# Patient Record
Sex: FEMALE | Race: WHITE | Employment: FULL TIME | URBAN - METROPOLITAN AREA
[De-identification: names, ages, dates, MRNs, and addresses within clinical notes are randomized per-mention and may not be internally consistent; named-entity substitution may affect disease eponyms.]

---

## 2018-06-29 ENCOUNTER — OFFICE VISIT (OUTPATIENT)
Dept: FAMILY MEDICINE CLINIC | Age: 38
End: 2018-06-29

## 2018-06-29 VITALS
RESPIRATION RATE: 18 BRPM | TEMPERATURE: 98.3 F | SYSTOLIC BLOOD PRESSURE: 104 MMHG | DIASTOLIC BLOOD PRESSURE: 66 MMHG | WEIGHT: 198.8 LBS | OXYGEN SATURATION: 98 % | HEART RATE: 88 BPM | BODY MASS INDEX: 31.2 KG/M2 | HEIGHT: 67 IN

## 2018-06-29 DIAGNOSIS — Z23 ENCOUNTER FOR IMMUNIZATION: ICD-10-CM

## 2018-06-29 DIAGNOSIS — K22.70 BARRETT'S ESOPHAGUS WITHOUT DYSPLASIA: ICD-10-CM

## 2018-06-29 DIAGNOSIS — E66.9 OBESITY (BMI 30.0-34.9): ICD-10-CM

## 2018-06-29 DIAGNOSIS — K44.9 HIATAL HERNIA: ICD-10-CM

## 2018-06-29 DIAGNOSIS — Z00.00 PREVENTATIVE HEALTH CARE: Primary | ICD-10-CM

## 2018-06-29 RX ORDER — RANITIDINE HCL 75 MG
75 TABLET ORAL
COMMUNITY
End: 2019-12-06

## 2018-06-29 RX ORDER — CETIRIZINE HCL 10 MG
10 TABLET ORAL DAILY
COMMUNITY
End: 2019-12-06

## 2018-06-29 RX ORDER — CYANOCOBALAMIN (VITAMIN B-12) 1000 MCG
1 TABLET, EXTENDED RELEASE ORAL
COMMUNITY
End: 2019-12-06

## 2018-06-29 NOTE — MR AVS SNAPSHOT
[address] [place], [place] [address]d[facility]ie [facility] 
[phone] Patient: Kathy Arellano MRN: [id number] Visit Information Date & Time Provider Department Dept. Phone Encounter #  
 6/29/2018  2:00 PM Seema Quintero, [address] [phone] [id number] Follow-up Instructions Return in about 3 months (around 9/29/2018) for Follow up. Upcoming Health Maintenance Date Due DTaP/Tdap/Td series (1 - Tdap) 3/31/1999 Influenza Age 5 to Adult 8/1/2018 PAP AKA CERVICAL CYTOLOGY 6/1/2020 Allergies as of 6/29/2018  Review Complete On: 6/29/2018 By: Seema Quintero MD  
  
 Severity Noted Reaction Type Reactions Contrave [Naltrexone-bupropion]  06/29/2018   Side Effect Other (comments) Made her extremely emotional  
  
Current Immunizations  Reviewed on 6/26/2018 No immunizations on file. Not reviewed this visit You Were Diagnosed With   
  
 Codes Comments Preventative health care    -  Primary ICD-10-CM: Z00.00 ICD-9-CM: V70.0 Garvey's esophagus without dysplasia     ICD-10-CM: K22.70 ICD-9-CM: 530.85 Hiatal hernia     ICD-10-CM: K44.9 ICD-9-CM: 553.3 Obesity (BMI 30.0-34.9)     ICD-10-CM: H53.1 ICD-9-CM: 278.00 Encounter for immunization     ICD-10-CM: V55 ICD-9-CM: V03.89 Vitals BP Pulse Temp Resp Height(growth percentile) Weight(growth percentile) 104/66 (BP 1 Location: Left arm, BP Patient Position: Sitting) 88 98.3 °F (36.8 °C) (Oral) 18 5' 7\" (1.702 m) 198 lb 12.8 oz (90.2 kg) LMP SpO2 BMI OB Status Smoking Status 06/08/2018 98% 31.14 kg/m2 Having regular periods Never Smoker BMI and BSA Data Body Mass Index Body Surface Area  
 31.14 kg/m 2 2.06 m 2 Preferred Pharmacy Pharmacy Name Phone [id number], [place] [address] [place] [address] [address] [phone] Your Updated Medication List  
  
   
This list is accurate as of 6/29/18  3:11 PM.  Always use your most recent med list.  
  
  
  
  
 APPLE CIDER VINEGAR PLUS PO Take 750 mg by mouth daily. TAKES TWO TABS DAILY Reather Numbers 1.5/30 (21) 1.5-30 mg-mcg Tab Generic drug:  norethindrone ac-eth estradiol  
daily. OTHER  
1/2 OF A DROPPER DAILY pantoprazole 40 mg tablet Commonly known as:  PROTONIX Take 1 Tab by mouth daily. PROBIOTIC PO Take  by mouth daily. raNITIdine 75 mg tablet Commonly known as:  ZANTAC Take 75 mg by mouth nightly. TAKES TWO TABS AT BEDTIME  
  
 selenium 200 mcg Cap Take 1 Tab by mouth. zinc 50 mg Tab tablet Take  by mouth daily. ZyrTEC 10 mg tablet Generic drug:  cetirizine Take 10 mg by mouth daily. We Performed the Following CBC WITH AUTOMATED DIFF [12136 CPT(R)] LIPID PANEL [38074 CPT(R)] METABOLIC PANEL, COMPREHENSIVE [80603 CPT(R)] T4, FREE [id number] CPT(R)] TSH 3RD GENERATION [43478 CPT(R)] Follow-up Instructions Return in about 3 months (around 9/29/2018) for Follow up. Patient Instructions Limit wine to 7 glasses per week (for your liver) 3-5 glasses per week would be better for your weight Try to cut out Diet Coke Find other drinks you like (flavored water, unsweet tea or hot tea) I would encourage you to pack your lunch Berries, apples, various veggies OR, get the same sandwich but with ham 
 
Keep exercising Garvey's Esophagus: Care Instructions Your Care Instructions The esophagus is the tube that connects the throat to the stomach. Food and liquids go through this tube. In Garvey's esophagus, the cells that line the tube change. This is usually because of gastroesophageal reflux disease (GERD). GERD causes acid from your stomach to back up into the esophagus.  
When you have Garvey's esophagus, you are slightly more likely to get cancer of the esophagus. So regular testing is important to watch for signs of this cancer. You can treat GERD to control your symptoms and feel better. Follow-up care is a key part of your treatment and safety. Be sure to make and go to all appointments, and call your doctor if you are having problems. It's also a good idea to know your test results and keep a list of the medicines you take. How can you care for yourself at home? · Take your medicines exactly as prescribed. Call your doctor if you think you are having a problem with your medicine. · If you take over-the-counter medicine, such as antacids or acid reducers, follow all instructions on the label. If you use these medicines often, talk with your doctor. Be careful when you take over-the-counter antacid medicines. Many of these medicines have aspirin in them. Read the label to make sure that you are not taking more than the recommended dose. Too much aspirin can be harmful. · Do not smoke or chew tobacco. Smoking can make GERD worse. If you need help quitting, talk to your doctor about stop-smoking programs and medicines. These can increase your chances of quitting for good. · Chocolate, mint, and alcohol can make GERD worse. They relax the valve between the esophagus and the stomach. · Spicy foods, foods that have a lot of acid (like tomatoes and oranges), and coffee can make GERD symptoms worse in some people. If your symptoms are worse after you eat a certain food, you may want to stop eating that food to see if your symptoms get better. · Eat smaller meals, and more often. After eating, wait 2 to 3 hours before you lie down. · Raise the head of your bed 6 to 8 inches by putting blocks under the frame or a foam wedge under the head of the mattress. · Do not wear tight clothing around your midsection. · If you are overweight, lose weight. Even losing 5 to 10 pounds can help. When should you call for help? Call your doctor now or seek immediate medical care if: 
? · You have new or worse belly pain. ? · You are vomiting. ? Watch closely for changes in your health, and be sure to contact your doctor if: 
? · You have any pain or difficulty swallowing. ? · You are losing weight. ? · You have new or worse symptoms, such as heartburn. ? · You do not get better as expected. Where can you learn more? Go to http://anabelle-kev.info/. Enter L146 in the search box to learn more about \"Garvey's Esophagus: Care Instructions. \" Current as of: May 12, 2017 Content Version: 11.4 © 4705-6316 [employer]. Care instructions adapted under license by [employer] (which disclaims liability or warranty for this information). If you have questions about a medical condition or this instruction, always ask your healthcare professional. [place] [address] any warranty or liability for your use of this information. Introducing hospitals & HEALTH SERVICES! [facility] introduces [employer] patient portal. Now you can access parts of your medical record, email your doctor's office, and request medication refills online. 1. In your internet browser, go to [email or web address] 2. Click on the First Time User? Click Here link in the Sign In box. You will see the New Member Sign Up page. 3. Enter your [employer] Access Code exactly as it appears below. You will not need to use this code after youve completed the sign-up process. If you do not sign up before the expiration date, you must request a new code. · [employer] Access Code: [email or web address] Expires: 9/27/2018  3:11 PM 
 
4. Enter the last four digits of your Social Security Number (xxxx) and Date of Birth (mm/dd/yyyy) as indicated and click Submit. You will be taken to the next sign-up page. 5. Create a [employer] ID.  This will be your [employer] login ID and cannot be changed, so think of one that is secure and easy to remember. 6. Create a [employer] password. You can change your password at any time. 7. Enter your Password Reset Question and Answer. This can be used at a later time if you forget your password. 8. Enter your e-mail address. You will receive e-mail notification when new information is available in [address] 9. Click Sign Up. You can now view and download portions of your medical record. 10. Click the Download Summary menu link to download a portable copy of your medical information. If you have questions, please visit the Frequently Asked Questions section of the [employer] website. Remember, [employer] is NOT to be used for urgent needs. For medical emergencies, dial 911. Now available from your iPhone and Android! Please provide this summary of care documentation to your next provider. Your primary care clinician is listed as Lennox Camarena. If you have any questions after today's visit, please call [phone].

## 2018-06-29 NOTE — PROGRESS NOTES
Bhavesh Berger [address] [facility]. Lena, [address] [facility]  [phone]             Date of visit: 6/29/2018   Subjective:      History obtained from:  the patient. Jesús London is a 34 y.o. female who presents today for new patient, hasn't been here in a few years    Wasn't having many symptoms but dentist kept seeing signs of reflux on her teeth  Finally got EGD in Aug 2017, showed hiatal hernia and Barretts without dysplasia  Not feeling heartburn much or coughing  Going to repeat endoscopy in August       Frustrated with her weight  Weight about the same  Doing exercise regularly  Did a diet in Jan, only 6 pounds but lost 20 inches overall  80 day obsession  When she did that would have protein shake or smoothie in AM  Regimented plan on what/when she ate  Lunch vinay/lettuce/apple  Snacks cottage cheese or humus with pretzels'  Sensible dinner with zucchini noodles and turkey/spaghetti sauce  Was on plan C for 2000kcal/day  Has used myfitness pal, usually  1800 but not losing weight    Since program finished is still doing protein smothie on weekday, waffles on weekend  Robinson/chips for lunch  Dinner might have pasta instead of zucchini noodles or a turkey burger with bread or tacos    Stopped drinking etoh for 2.5 months but that didn't help her weight either  Quit coffee to do the tea detox right now.  With 1t honey in it    Hasn't tried quitting Diet coke yet, thinking about that  Usually has it at lunch, cafeteria at work with fountain soda, sandwich and chips  1 bottle of wine per week, has it on weekends    At home blood test marco a well food sensitivity test  Also had metabolism test showing cortisol, TSH, and testosterone     Took contrave 2014  Definitely helped but made her cry a lot    Pap normal last year per gyn  Had normal mammogram in past, will do another this year    Patient Active Problem List    Diagnosis Date Noted    Garvey's esophagus without dysplasia 06/29/2018    Hiatal hernia 06/29/2018    Obesity (BMI 30.0-34.9) 06/29/2018     Current Outpatient Prescriptions   Medication Sig Dispense Refill    selenium 200 mcg cap Take 1 Tab by mouth.  chrm/vineg/bit-orang peel/gr t (APPLE CIDER VINEGAR PLUS PO) Take 750 mg by mouth daily. TAKES TWO TABS DAILY      zinc 50 mg tab tablet Take  by mouth daily.  OTHER 1/2 OF A DROPPER DAILY      cetirizine (ZYRTEC) 10 mg tablet Take 10 mg by mouth daily.  raNITIdine (ZANTAC) 75 mg tablet Take 75 mg by mouth nightly. TAKES TWO TABS AT BEDTIME     Marjean Ion 1.5/30, 21, 1.5-30 mg-mcg tab daily.  pantoprazole (PROTONIX) 40 mg tablet Take 1 Tab by mouth daily. 30 Tab 2    LACTOBACILLUS ACIDOPHILUS (PROBIOTIC PO) Take  by mouth daily.        Allergies   Allergen Reactions    Contrave [Naltrexone-Bupropion] Other (comments)     Made her extremely emotional     Past Medical History:   Diagnosis Date    AR (allergic rhinitis)     Garvey's esophagus 08/2017    Garvey's esophagus without dysplasia 6/29/2018    Hiatal hernia 08/2017    Overactive bladder      Past Surgical History:   Procedure Laterality Date    HX ENDOSCOPY  08/2017     Family History   Problem Relation Age of Onset    GERD Father     Breast Cancer Maternal Grandmother     Depression Sister     Breast Cancer Maternal Aunt     Colon Cancer Neg Hx     Colon Polyps Neg Hx      Social History   Substance Use Topics    Smoking status: Never Smoker    Smokeless tobacco: Never Used    Alcohol use Yes      Comment: ~ 4 drinks a week (glass of wine)      Social History     Social History Narrative    Works in [address] [place] [address] Avenue: denies chest pain  Pulm: denies shortness of breath   GI: denies hematochezia  Psych denies depressed mood or anhedonia        Objective:     Vitals:    06/29/18 1420   BP: 104/66   Pulse: 88   Resp: 18   Temp: 98.3 °F (36.8 °C)   TempSrc: Oral   SpO2: 98%   Weight: 198 lb 12.8 oz (90.2 kg)   Height: 5' 7\" (1.702 m)     Body mass index is 31.14 kg/(m^2). General: stated age,obese, and in NAD  Eyes: PERRL, EOMI, no redness or drainage  Nose: no drainage  Mouth: no lesions  Throat: no erythema, exudate or swelling  Neck: supple, symmetrical, trachea midline, no adenopathy and thyroid: not enlarged, symmetric, no tenderness/mass/nodules  Lungs:  clear to auscultation w/o rales, rhonchi, wheezes w/normal effort and no use of accessory muscles of respiration   Heart: regular rate and rhythm, S1, S2 normal, no murmur, click, rub or gallop  Abdomen: soft, nontender, no masses  Ext:  No edema noted. Lymph: no cervical adenopathy appreciated  Skin:  Normal. and no rash or abnormalities   Neuro: normal gait, CN 2-12 intact  Psych: alert and oriented to person, place, time and situation and Speech: appropriate quality, quantity and organization of sentences and normal affect    Assessment/Plan:       ICD-10-CM ICD-9-CM    1. Preventative health care Z00.00 V70.0 LIPID PANEL      CBC WITH AUTOMATED DIFF      METABOLIC PANEL, COMPREHENSIVE      TSH 3RD GENERATION      T4, FREE   2. Garvey's esophagus without dysplasia K22.70 530.85 CBC WITH AUTOMATED DIFF      METABOLIC PANEL, COMPREHENSIVE   3. Hiatal hernia K44.9 553.3    4. Obesity (BMI 30.0-34. 9) E66.9 278.00 LIPID PANEL      TSH 3RD GENERATION      T4, FREE   5.  Encounter for immunization Z23 V03.89 TETANUS, DIPHTHERIA TOXOIDS AND ACELLULAR PERTUSSIS VACCINE (TDAP), IN INDIVIDS. >=7, IM        Orders Placed This Encounter    Tetanus, diphtheria toxoids and acellular pertussis (TDAP) vaccine, in individuals >=7 years, IM    LIPID PANEL    CBC WITH AUTOMATED DIFF    METABOLIC PANEL, COMPREHENSIVE    TSH 3RD GENERATION    T4, FREE    selenium 200 mcg cap    chrm/vineg/bit-orang peel/gr t (APPLE CIDER VINEGAR PLUS PO)    zinc 50 mg tab tablet    OTHER    cetirizine (ZYRTEC) 10 mg tablet    raNITIdine (ZANTAC) 75 mg tablet       Preventive care mostly up to date  tdap today  Discussed diet, exercise, weight loss  Tips given (pt instructions)  Did not recommend another diet pill at this point; needs long-term lifestyle changes mainly with diet  The Barretts is asymptomatic, getting repeat EGD this fall but hope that will be improved with the PPI    Discussed the diagnosis and plan and she expressed understanding. Follow-up Disposition:  Return in about 3 months (around 9/29/2018) for Follow up.     Jaswant Dobbins MD

## 2018-06-29 NOTE — PATIENT INSTRUCTIONS
Limit wine to 7 glasses per week (for your liver)  3-5 glasses per week would be better for your weight  Try to cut out Diet Coke  Find other drinks you like (flavored water, unsweet tea or hot tea)    I would encourage you to pack your lunch  Berries, apples, various veggies    OR, get the same sandwich but with ham    Keep exercising         Garvey's Esophagus: Care Instructions  Your Care Instructions    The esophagus is the tube that connects the throat to the stomach. Food and liquids go through this tube. In Garvey's esophagus, the cells that line the tube change. This is usually because of gastroesophageal reflux disease (GERD). GERD causes acid from your stomach to back up into the esophagus. When you have Garvey's esophagus, you are slightly more likely to get cancer of the esophagus. So regular testing is important to watch for signs of this cancer. You can treat GERD to control your symptoms and feel better. Follow-up care is a key part of your treatment and safety. Be sure to make and go to all appointments, and call your doctor if you are having problems. It's also a good idea to know your test results and keep a list of the medicines you take. How can you care for yourself at home? · Take your medicines exactly as prescribed. Call your doctor if you think you are having a problem with your medicine. · If you take over-the-counter medicine, such as antacids or acid reducers, follow all instructions on the label. If you use these medicines often, talk with your doctor. Be careful when you take over-the-counter antacid medicines. Many of these medicines have aspirin in them. Read the label to make sure that you are not taking more than the recommended dose. Too much aspirin can be harmful. · Do not smoke or chew tobacco. Smoking can make GERD worse. If you need help quitting, talk to your doctor about stop-smoking programs and medicines.  These can increase your chances of quitting for good.  · Chocolate, mint, and alcohol can make GERD worse. They relax the valve between the esophagus and the stomach. · Spicy foods, foods that have a lot of acid (like tomatoes and oranges), and coffee can make GERD symptoms worse in some people. If your symptoms are worse after you eat a certain food, you may want to stop eating that food to see if your symptoms get better. · Eat smaller meals, and more often. After eating, wait 2 to 3 hours before you lie down. · Raise the head of your bed 6 to 8 inches by putting blocks under the frame or a foam wedge under the head of the mattress. · Do not wear tight clothing around your midsection. · If you are overweight, lose weight. Even losing 5 to 10 pounds can help. When should you call for help? Call your doctor now or seek immediate medical care if:  ? · You have new or worse belly pain. ? · You are vomiting. ? Watch closely for changes in your health, and be sure to contact your doctor if:  ? · You have any pain or difficulty swallowing. ? · You are losing weight. ? · You have new or worse symptoms, such as heartburn. ? · You do not get better as expected. Where can you learn more? Go to http://anabelle-kev.info/. Enter L146 in the search box to learn more about \"Garvey's Esophagus: Care Instructions. \"  Current as of: May 12, 2017  Content Version: 11.4  © 4935-3900 [employer]. Care instructions adapted under license by [employer] (which disclaims liability or warranty for this information). If you have questions about a medical condition or this instruction, always ask your healthcare professional. [place] [address] any warranty or liability for your use of this information.

## 2018-06-29 NOTE — PROGRESS NOTES
Chief Complaint   Patient presents with   [facility].     former patient of [facility]-here to re-establish care      1. Have you been to the ER, urgent care clinic since your last visit? Hospitalized since your last visit? No    2. Have you seen or consulted any other health care providers outside of the [address] [place] [address] since your last visit? Include any pap smears or colon screening.  No

## 2018-06-30 LAB
ALBUMIN SERPL-MCNC: 4.2 G/DL (ref 3.5–5.5)
ALBUMIN/GLOB SERPL: 1.6 {RATIO} (ref 1.2–2.2)
ALP SERPL-CCNC: 71 IU/L (ref 39–117)
ALT SERPL-CCNC: 18 IU/L (ref 0–32)
AST SERPL-CCNC: 16 IU/L (ref 0–40)
BASOPHILS # BLD AUTO: 0.1 X10E3/UL (ref 0–0.2)
BASOPHILS NFR BLD AUTO: 1 %
BILIRUB SERPL-MCNC: <0.2 MG/DL (ref 0–1.2)
BUN SERPL-MCNC: 11 MG/DL (ref 6–24)
BUN/CREAT SERPL: 15 (ref 9–23)
CALCIUM SERPL-MCNC: 9.2 MG/DL (ref 8.7–10.2)
CHLORIDE SERPL-SCNC: 106 MMOL/L (ref 96–106)
CHOLEST SERPL-MCNC: 164 MG/DL (ref 100–199)
CO2 SERPL-SCNC: 23 MMOL/L (ref 20–29)
CREAT SERPL-MCNC: 0.72 MG/DL (ref 0.57–1)
EOSINOPHIL # BLD AUTO: 0.2 X10E3/UL (ref 0–0.4)
EOSINOPHIL NFR BLD AUTO: 3 %
ERYTHROCYTE [DISTWIDTH] IN BLOOD BY AUTOMATED COUNT: 13.6 % (ref 12.3–15.4)
GLOBULIN SER CALC-MCNC: 2.6 G/DL (ref 1.5–4.5)
GLUCOSE SERPL-MCNC: 82 MG/DL (ref 65–99)
HCT VFR BLD AUTO: 38.5 % (ref 34–46.6)
HDLC SERPL-MCNC: 41 MG/DL
HGB BLD-MCNC: 12.7 G/DL (ref 11.1–15.9)
IMM GRANULOCYTES # BLD: 0 X10E3/UL (ref 0–0.1)
IMM GRANULOCYTES NFR BLD: 0 %
INTERPRETATION, 910389: NORMAL
LDLC SERPL CALC-MCNC: 73 MG/DL (ref 0–99)
LYMPHOCYTES # BLD AUTO: 3.3 X10E3/UL (ref 0.7–3.1)
LYMPHOCYTES NFR BLD AUTO: 41 %
MCH RBC QN AUTO: 28.1 PG (ref 26.6–33)
MCHC RBC AUTO-ENTMCNC: 33 G/DL (ref 31.5–35.7)
MCV RBC AUTO: 85 FL (ref 79–97)
MONOCYTES # BLD AUTO: 0.5 X10E3/UL (ref 0.1–0.9)
MONOCYTES NFR BLD AUTO: 6 %
NEUTROPHILS # BLD AUTO: 3.9 X10E3/UL (ref 1.4–7)
NEUTROPHILS NFR BLD AUTO: 49 %
PLATELET # BLD AUTO: 404 X10E3/UL (ref 150–379)
POTASSIUM SERPL-SCNC: 4.2 MMOL/L (ref 3.5–5.2)
PROT SERPL-MCNC: 6.8 G/DL (ref 6–8.5)
RBC # BLD AUTO: 4.52 X10E6/UL (ref 3.77–5.28)
SODIUM SERPL-SCNC: 142 MMOL/L (ref 134–144)
T4 FREE SERPL-MCNC: 1.15 NG/DL (ref 0.82–1.77)
TRIGL SERPL-MCNC: 251 MG/DL (ref 0–149)
TSH SERPL DL<=0.005 MIU/L-ACNC: 1.48 UIU/ML (ref 0.45–4.5)
VLDLC SERPL CALC-MCNC: 50 MG/DL (ref 5–40)
WBC # BLD AUTO: 8 X10E3/UL (ref 3.4–10.8)

## 2018-06-30 NOTE — PROGRESS NOTES
Sent in letter: All tests were perfect, including 2 thyroid tests, cholesterol, kidney, liver, sugar, electrolytes, and blood counts.

## 2019-12-06 ENCOUNTER — OFFICE VISIT (OUTPATIENT)
Dept: FAMILY MEDICINE CLINIC | Age: 39
End: 2019-12-06

## 2019-12-06 VITALS
HEART RATE: 77 BPM | RESPIRATION RATE: 19 BRPM | WEIGHT: 201.4 LBS | TEMPERATURE: 98.1 F | OXYGEN SATURATION: 98 % | SYSTOLIC BLOOD PRESSURE: 110 MMHG | HEIGHT: 67 IN | DIASTOLIC BLOOD PRESSURE: 70 MMHG | BODY MASS INDEX: 31.61 KG/M2

## 2019-12-06 DIAGNOSIS — Z11.59 ENCOUNTER FOR HEPATITIS C SCREENING TEST FOR LOW RISK PATIENT: ICD-10-CM

## 2019-12-06 DIAGNOSIS — K22.70 BARRETT'S ESOPHAGUS WITHOUT DYSPLASIA: ICD-10-CM

## 2019-12-06 DIAGNOSIS — L70.0 ACNE VULGARIS: ICD-10-CM

## 2019-12-06 DIAGNOSIS — G89.29 CHRONIC PAIN OF BOTH KNEES: ICD-10-CM

## 2019-12-06 DIAGNOSIS — K44.9 HIATAL HERNIA: ICD-10-CM

## 2019-12-06 DIAGNOSIS — E88.81 INSULIN RESISTANCE: ICD-10-CM

## 2019-12-06 DIAGNOSIS — E55.9 VITAMIN D INSUFFICIENCY: ICD-10-CM

## 2019-12-06 DIAGNOSIS — M25.561 CHRONIC PAIN OF BOTH KNEES: ICD-10-CM

## 2019-12-06 DIAGNOSIS — H93.8X3 EAR FULLNESS, BILATERAL: ICD-10-CM

## 2019-12-06 DIAGNOSIS — Z00.00 PREVENTATIVE HEALTH CARE: Primary | ICD-10-CM

## 2019-12-06 DIAGNOSIS — M25.562 CHRONIC PAIN OF BOTH KNEES: ICD-10-CM

## 2019-12-06 DIAGNOSIS — Z11.4 ENCOUNTER FOR SCREENING FOR HIV: ICD-10-CM

## 2019-12-06 DIAGNOSIS — E66.9 OBESITY (BMI 30.0-34.9): ICD-10-CM

## 2019-12-06 DIAGNOSIS — M54.50 NONSPECIFIC LOW BACK PAIN: ICD-10-CM

## 2019-12-06 RX ORDER — CLINDAMYCIN AND BENZOYL PEROXIDE 10; 50 MG/G; MG/G
GEL TOPICAL 2 TIMES DAILY
Qty: 1 TUBE | Refills: 2 | Status: SHIPPED | OUTPATIENT
Start: 2019-12-06 | End: 2020-09-11

## 2019-12-06 RX ORDER — CYANOCOBALAMIN (VITAMIN B-12) 500 MCG
TABLET ORAL
COMMUNITY
End: 2019-12-06

## 2019-12-06 NOTE — PROGRESS NOTES
Chief Complaint   Patient presents with    Complete Physical    Wax in Ear     1. Have you been to the ER, urgent care clinic since your last visit? Hospitalized since your last visit? No    2. Have you seen or consulted any other health care providers outside of the [address] [place] [address] since your last visit? Include any pap smears or colon screening.  Saw Dr. Ronnell Crabtree and Dr. Bryon Daniels

## 2019-12-06 NOTE — PATIENT INSTRUCTIONS
Well Visit, Ages 25 to 48: Care Instructions Your Care Instructions Physical exams can help you stay healthy. Your doctor has checked your overall health and may have suggested ways to take good care of yourself. He or she also may have recommended tests. At home, you can help prevent illness with healthy eating, regular exercise, and other steps. Follow-up care is a key part of your treatment and safety. Be sure to make and go to all appointments, and call your doctor if you are having problems. It's also a good idea to know your test results and keep a list of the medicines you take. How can you care for yourself at home? · Reach and stay at a healthy weight. This will lower your risk for many problems, such as obesity, diabetes, heart disease, and high blood pressure. · Get at least 30 minutes of physical activity on most days of the week. Walking is a good choice. You also may want to do other activities, such as running, swimming, cycling, or playing tennis or team sports. Discuss any changes in your exercise program with your doctor. · Do not smoke or allow others to smoke around you. If you need help quitting, talk to your doctor about stop-smoking programs and medicines. These can increase your chances of quitting for good. · Talk to your doctor about whether you have any risk factors for sexually transmitted infections (STIs). Having one sex partner (who does not have STIs and does not have sex with anyone else) is a good way to avoid these infections. · Use birth control if you do not want to have children at this time. Talk with your doctor about the choices available and what might be best for you. · Protect your skin from too much sun. When you're outdoors from 10 a.m. to 4 p.m., stay in the shade or cover up with clothing and a hat with a wide brim. Wear sunglasses that block UV rays. Even when it's cloudy, put broad-spectrum sunscreen (SPF 30 or higher) on any exposed skin. · See a dentist one or two times a year for checkups and to have your teeth cleaned. · Wear a seat belt in the car. Follow your doctor's advice about when to have certain tests. These tests can spot problems early. For everyone · Cholesterol. Have the fat (cholesterol) in your blood tested after age 21. Your doctor will tell you how often to have this done based on your age, family history, or other things that can increase your risk for heart disease. · Blood pressure. Have your blood pressure checked during a routine doctor visit. Your doctor will tell you how often to check your blood pressure based on your age, your blood pressure results, and other factors. · Vision. Talk with your doctor about how often to have a glaucoma test. 
· Diabetes. Ask your doctor whether you should have tests for diabetes. · Colon cancer. Your risk for colorectal cancer gets higher as you get older. Some experts say that adults should start regular screening at age 48 and stop at age 76. Others say to start before age 48 or continue after age 76. Talk with your doctor about your risk and when to start and stop screening. For women · Breast exam and mammogram. Talk to your doctor about when you should have a clinical breast exam and a mammogram. Medical experts differ on whether and how often women under 50 should have these tests. Your doctor can help you decide what is right for you. · Cervical cancer screening test and pelvic exam. Begin with a Pap test at age 24. The test often is part of a pelvic exam. Starting at age 27, you may choose to have a Pap test, an HPV test, or both tests at the same time (called co-testing). Talk with your doctor about how often to have testing. · Tests for sexually transmitted infections (STIs). Ask whether you should have tests for STIs. You may be at risk if you have sex with more than one person, especially if your partners do not wear condoms. For men · Tests for sexually transmitted infections (STIs). Ask whether you should have tests for STIs. You may be at risk if you have sex with more than one person, especially if you do not wear a condom. · Testicular cancer exam. Ask your doctor whether you should check your testicles regularly. · Prostate exam. Talk to your doctor about whether you should have a blood test (called a PSA test) for prostate cancer. Experts differ on whether and when men should have this test. Some experts suggest it if you are older than 39 and are -American or have a father or brother who got prostate cancer when he was younger than 72. When should you call for help? Watch closely for changes in your health, and be sure to contact your doctor if you have any problems or symptoms that concern you. Where can you learn more? Go to http://anabelle-kev.info/. Enter P072 in the search box to learn more about \"Well Visit, Ages 25 to 48: Care Instructions. \" Current as of: December 13, 2018 Content Version: 12.2 © 3481-6156 [employer]wise, Incorporated. Care instructions adapted under license by [employer] (which disclaims liability or warranty for this information). If you have questions about a medical condition or this instruction, always ask your healthcare professional. [place] [address] any warranty or liability for your use of this information. Low Back Pain: Exercises Introduction Here are some examples of exercises for you to try. The exercises may be suggested for a condition or for rehabilitation. Start each exercise slowly. Ease off the exercises if you start to have pain. You will be told when to start these exercises and which ones will work best for you. How to do the exercises Press-up 1. Lie on your stomach, supporting your body with your forearms. 2. Press your elbows down into the floor to raise your upper back.  As you do this, relax your stomach muscles and allow your back to arch without using your back muscles. As your press up, do not let your hips or pelvis come off the floor. 3. Hold for 15 to 30 seconds, then relax. 4. Repeat 2 to 4 times. Alternate arm and leg (bird dog) exercise 1. Start on the floor, on your hands and knees. 2. Tighten your belly muscles. 3. Raise one leg off the floor, and hold it straight out behind you. Be careful not to let your hip drop down, because that will twist your trunk. 4. Hold for about 6 seconds, then lower your leg and switch to the other leg. 5. Repeat 8 to 12 times on each leg. 6. Over time, work up to holding for 10 to 30 seconds each time. 7. If you feel stable and secure with your leg raised, try raising the opposite arm straight out in front of you at the same time. Knee-to-chest exercise 1. Lie on your back with your knees bent and your feet flat on the floor. 2. Bring one knee to your chest, keeping the other foot flat on the floor (or keeping the other leg straight, whichever feels better on your lower back). 3. Keep your lower back pressed to the floor. Hold for at least 15 to 30 seconds. 4. Relax, and lower the knee to the starting position. 5. Repeat with the other leg. Repeat 2 to 4 times with each leg. 6. To get more stretch, put your other leg flat on the floor while pulling your knee to your chest. 
 
Curl-ups 1. Lie on the floor on your back with your knees bent at a 90-degree angle. Your feet should be flat on the floor, about 12 inches from your buttocks. 2. Cross your arms over your chest. If this bothers your neck, try putting your hands behind your neck (not your head), with your elbows spread apart. 3. Slowly tighten your belly muscles and raise your shoulder blades off the floor.  
4. Keep your head in line with your body, and do not press your chin to your chest. 
 5. Hold this position for 1 or 2 seconds, then slowly lower yourself back down to the floor. 6. Repeat 8 to 12 times. Pelvic tilt exercise 1. Lie on your back with your knees bent. 2. \"Brace\" your stomach. This means to tighten your muscles by pulling in and imagining your belly button moving toward your spine. You should feel like your back is pressing to the floor and your hips and pelvis are rocking back. 3. Hold for about 6 seconds while you breathe smoothly. 4. Repeat 8 to 12 times. Heel dig bridging 1. Lie on your back with both knees bent and your ankles bent so that only your heels are digging into the floor. Your knees should be bent about 90 degrees. 2. Then push your heels into the floor, squeeze your buttocks, and lift your hips off the floor until your shoulders, hips, and knees are all in a straight line. 3. Hold for about 6 seconds as you continue to breathe normally, and then slowly lower your hips back down to the floor and rest for up to 10 seconds. 4. Do 8 to 12 repetitions. Hamstring stretch in doorway 1. Lie on your back in a doorway, with one leg through the open door. 2. Slide your leg up the wall to straighten your knee. You should feel a gentle stretch down the back of your leg. 3. Hold the stretch for at least 15 to 30 seconds. Do not arch your back, point your toes, or bend either knee. Keep one heel touching the floor and the other heel touching the wall. 4. Repeat with your other leg. 5. Do 2 to 4 times for each leg. Hip flexor stretch 1. Kneel on the floor with one knee bent and one leg behind you. Place your forward knee over your foot. Keep your other knee touching the floor. 2. Slowly push your hips forward until you feel a stretch in the upper thigh of your rear leg. 3. Hold the stretch for at least 15 to 30 seconds. Repeat with your other leg. 4. Do 2 to 4 times on each side. Wall sit 1. Stand with your back 10 to 12 inches away from a wall. 2. Lean into the wall until your back is flat against it. 3. Slowly slide down until your knees are slightly bent, pressing your lower back into the wall. 4. Hold for about 6 seconds, then slide back up the wall. 5. Repeat 8 to 12 times. Follow-up care is a key part of your treatment and safety. Be sure to make and go to all appointments, and call your doctor if you are having problems. It's also a good idea to know your test results and keep a list of the medicines you take. Where can you learn more? Go to http://anabelle-kev.info/. Enter N893 in the search box to learn more about \"Low Back Pain: Exercises. \" Current as of: June 26, 2019 Content Version: 12.2 © 2519-1558 [employer]orporated. Care instructions adapted under license by [employer] (which disclaims liability or warranty for this information). If you have questions about a medical condition or this instruction, always ask your healthcare professional. [address] any warranty or liability for your use of this information.

## 2019-12-06 NOTE — PROGRESS NOTES
[facility]  [place]. [place], [address]  [phone]             Date of visit: 12/6/2019   Subjective:      History obtained from:  the patient. Todd Lara is a 37 y.o. female who presents today for physical    Felt like ears had wax  Not painful  Years ago had large amount removed    Right knee hurting today  Pain tends to be behind knee cap  Worse with activity  Both hurt at times. Has been having lower back pain  Saw Dr. Sophia Argueta  Had MRI, didn't show anything  He suggested PT but she has not scheduled yet    History of Barretts esophagus without dysplasia noted on EGD in 8/17  Repeated in 9/18, no changes  Will repeat EGD in 3 years  Just went back to GI who said ok to try off zantac  Will see her next year    Difficultly losing weight in the past and has tried many things  This past year saw her obgyn to discuss  Then saw Dr. Stacey Taylor weight loss doctor and his dietician. Quit seeing them this year  He was rx'ing metformin 2000mg daily (insulin levels were in the 50s and a1c was normal) and naltrexone (didn't help that much)  That did help, lost some weight, also was on a more regimented food program  Then he mentioned giving her topamax but she didn't want to use that, was concerned about risk  He also mentioned saxenda but insurance would not cover. Then did another program called stronger you that checked macros (limited carbs, fat, protein)  Got down to 189    Also in the past  Has tried many regimens (80 day obsession, plan C, myFitness pal at 1800kcal, protein smoothie regimen, coming off etoh, regular exercise, etc)  Took contrave 2014 (helped but made her cry a lot)  Has tried Weight Watchers earlier this summer, didn't work for her.   Has tried keto  Has done 21 day sugar detox  Tried Whole 30 but only for 2 weeks    Not a binge eater  Has protein shake with spinach, 1/2 banana, 1 tsp pb, almond milk  4 biscoff cookies for snack  Lunch a salad or PB&J with low carb bread, doritos  Sometimes pretzels  Trying not to snack after work  Dinner varies, might have 1 slice pizza, or spaghetti, or chicken, or fish, or different things she makes  Doesn't eat after dinner  Trying not to drink as much  Mostly drinks water, 1 diet Pepsi at lunch    Does a lot of exercise, walks an hour, also beach body work out    Periods mostly regular with pills, some months doesn't bleed  Pap up to date with gyn this summer, normal  Had normal mammogram in the past, did get a mammogram last year, was fine, considering every year. Wonders if perimenpausal because hebert. The [employer] is helping her mood    Having more acne on face  Would like an acne cream  Not sure what has helped in the past  Has not had this for a long time    Already had flu shot    Patient Active Problem List    Diagnosis Date Noted    Acne vulgaris 12/06/2019    Chronic pain of both knees 12/06/2019    Nonspecific low back pain 12/06/2019    Garvey's esophagus without dysplasia 06/29/2018    Hiatal hernia 06/29/2018    Obesity (BMI 30.0-34.9) 06/29/2018     Current Outpatient Medications   Medication Sig Dispense Refill    clindamycin-benzoyl peroxide (BENZACLIN) 1-5 % topical gel Apply  to affected area two (2) times a day. Apply to affected area after the skin has been cleansed and dried. 1 Tube 2    JUNEL 1.5/30, 21, 1.5-30 mg-mcg tab daily.  pantoprazole (PROTONIX) 40 mg tablet Take 1 Tab by mouth daily. 30 Tab 2    vitamin b12-folic acid 0.4-8 mg tab Vitamin B12      cholecalciferol (VITAMIN D3) (400 Units /10 mcg) tab tablet Vitamin D3      selenium 200 mcg cap Take 1 Tab by mouth.  chrm/vineg/bit-orang peel/gr t (APPLE CIDER VINEGAR PLUS PO) Take 750 mg by mouth daily. TAKES TWO TABS DAILY      zinc 50 mg tab tablet Take  by mouth daily.  OTHER 1/2 OF A DROPPER DAILY      cetirizine (ZYRTEC) 10 mg tablet Take 10 mg by mouth daily.       raNITIdine (ZANTAC) 75 mg tablet Take 75 mg by mouth nightly. TAKES TWO TABS AT BEDTIME      LACTOBACILLUS ACIDOPHILUS (PROBIOTIC PO) Take  by mouth daily. Allergies   Allergen Reactions    Contrave [Naltrexone-Bupropion] Other (comments)     Made her extremely emotional     Past Medical History:   Diagnosis Date    AR (allergic rhinitis)     Garvey's esophagus 08/2017    Garvey's esophagus without dysplasia 6/29/2018    Hiatal hernia 08/2017    Overactive bladder      Past Surgical History:   Procedure Laterality Date    HX ENDOSCOPY  08/2017    HX ENDOSCOPY  09/20/2018    Garvey's esophagus no dysplasia repeat 3 years     Family History   Problem Relation Age of Onset    GERD Father     Breast Cancer Maternal Grandmother     Depression Sister     Breast Cancer Maternal Aunt     Colon Cancer Neg Hx     Colon Polyps Neg Hx      Social History     Tobacco Use    Smoking status: Never Smoker    Smokeless tobacco: Never Used   Substance Use Topics    Alcohol use: Yes     Comment: ~ 4 drinks a week (glass of wine)      Social History     Patient does not qualify to have social determinant information on file (likely too young). Social History Narrative    Works in [employer], dad, sister in the area        Review of Systems  Psych: denies suicidal ideation  GI denies abd pain    3 most recent PHQ Screens 12/6/2019   PHQ Not Done -   Little interest or pleasure in doing things Not at all   Feeling down, depressed, irritable, or hopeless Not at all   Total Score PHQ 2 0        Objective:     Vitals:    12/06/19 0828   BP: 110/70   Pulse: 77   Resp: 19   Temp: 98.1 °F (36.7 °C)   TempSrc: Oral   SpO2: 98%   Weight: 201 lb 6.4 oz (91.4 kg)   Height: 5' 7\" (1.702 m)     Body mass index is 31.54 kg/m².      General: stated age,obese, and in NAD  Ears: TMs clear  Eyes: PERRL, EOMI, no redness or drainage  Nose: no drainage  Mouth: no lesions  Throat: no erythema, exudate or swelling  Neck: supple, symmetrical, trachea midline, no adenopathy and thyroid: not enlarged, symmetric, no tenderness/mass/nodules  Lungs:  clear to auscultation w/o rales, rhonchi, wheezes w/normal effort and no use of accessory muscles of respiration   Heart: regular rate and rhythm, S1, S2 normal, no murmur, click, rub or gallop  Abdomen: soft, nontender, no masses  MSK: knees without effusion or tenderness, normal ROM  Ext:  No edema noted. Lymph: no cervical adenopathy appreciated  Skin:  Normal. and no rash or abnormalities   Neuro: normal gait, CN 2-12 intact  Psych: alert and oriented to person, place, time and situation and Speech: appropriate quality, quantity and organization of sentences and normal affect  Assessment/Plan:       ICD-10-CM ICD-9-CM    1. Preventative health care Z00.00 V70.0 INSULIN      CBC WITH AUTOMATED DIFF      METABOLIC PANEL, COMPREHENSIVE      LIPID PANEL      HEMOGLOBIN A1C WITH EAG      VITAMIN D, 25 HYDROXY      HEPATITIS C AB      HIV 1/2 AG/AB, 4TH GENERATION,W RFLX CONFIRM   2. Garvey's esophagus without dysplasia K22.70 530.85 CBC WITH AUTOMATED DIFF      METABOLIC PANEL, COMPREHENSIVE   3. Ear fullness, bilateral H93.8X3 388.8    4. Acne vulgaris L70.0 706.1    5. Chronic pain of both knees M25.561 719.46     M25.562 338.29     G89.29     6. Obesity (BMI 30.0-34. 9) E66.9 278.00    7. Nonspecific low back pain M54.5 724.2    8. Hiatal hernia K44.9 553.3    9. Insulin resistance E88.81 277.7 INSULIN      HEMOGLOBIN A1C WITH EAG   10. Vitamin D insufficiency E55.9 268.9 VITAMIN D, 25 HYDROXY   11.  Encounter for screening for HIV Z11.4 V73.89 HIV 1/2 AG/AB, 4TH GENERATION,W RFLX CONFIRM   12. Encounter for hepatitis C screening test for low risk patient Z11.59 V73.89 HEPATITIS C AB        Orders Placed This Encounter    INSULIN    CBC WITH AUTOMATED DIFF    METABOLIC PANEL, COMPREHENSIVE    LIPID PANEL    HEMOGLOBIN A1C WITH EAG    VITAMIN D, 25 HYDROXY    HEPATITIS C AB    HIV 1/2 AG/AB, 4TH GENERATION,W RFLX CONFIRM  vitamin b12-folic acid 1.3-2 mg tab    cholecalciferol (VITAMIN D3) (400 Units /10 mcg) tab tablet    clindamycin-benzoyl peroxide (BENZACLIN) 1-5 % topical gel     Preventive up to date  Very good exercise regimen  Needs to eat low carb more consistently  Unfortunately just has a low metabolism and tends to do better with her weight when she eats low carb  Has tried many diets, weight loss meds as above  If we tried another med would do topamax  I don't think her weight is bad enough to justify the risks of Saxenda, and her insurance would not cover it anyway  Recheck insulin levels as she was concerned about those, had insulin resistance but still with normal a1c last year  Didn't really like metformin so won't resume that unless really needed  Try benzaclin for acne  May be perimenopausal as she asked, hard to tell with on OCPs but she doesn't want to stop those  Will continue to see her gyn  Discussed risks/benefits mammogram and questionable benefit in her 45s, she elects to do every 2 years due to family history in 2nd degree relatives  Pain in knees sounds like nonspecific anterior knee pain, recommended quad strengthening  Gave home exercises for nonspecific low back pain (already had neg MRI) as she is not really wanting to do PT  Barrets stable, followed by GI, they cut out her zantac so only on protonix now  Has known hiatal hernia  Vit D low in the past    Discussed the diagnosis and plan and she expressed understanding.     Follow-up and Dispositions    · Return in about 1 year (around 12/6/2020) for Full Beryl Berumen MD

## 2019-12-07 LAB
25(OH)D3+25(OH)D2 SERPL-MCNC: 44.8 NG/ML (ref 30–100)
ALBUMIN SERPL-MCNC: 4.3 G/DL (ref 3.5–5.5)
ALBUMIN/GLOB SERPL: 1.7 {RATIO} (ref 1.2–2.2)
ALP SERPL-CCNC: 71 IU/L (ref 39–117)
ALT SERPL-CCNC: 15 IU/L (ref 0–32)
AST SERPL-CCNC: 12 IU/L (ref 0–40)
BASOPHILS # BLD AUTO: 0.1 X10E3/UL (ref 0–0.2)
BASOPHILS NFR BLD AUTO: 2 %
BILIRUB SERPL-MCNC: 0.4 MG/DL (ref 0–1.2)
BUN SERPL-MCNC: 11 MG/DL (ref 6–24)
BUN/CREAT SERPL: 14 (ref 9–23)
CALCIUM SERPL-MCNC: 9.4 MG/DL (ref 8.7–10.2)
CHLORIDE SERPL-SCNC: 106 MMOL/L (ref 96–106)
CHOLEST SERPL-MCNC: 180 MG/DL (ref 100–199)
CO2 SERPL-SCNC: 23 MMOL/L (ref 20–29)
CREAT SERPL-MCNC: 0.79 MG/DL (ref 0.57–1)
EOSINOPHIL # BLD AUTO: 0.2 X10E3/UL (ref 0–0.4)
EOSINOPHIL NFR BLD AUTO: 2 %
ERYTHROCYTE [DISTWIDTH] IN BLOOD BY AUTOMATED COUNT: 12.6 % (ref 12.3–15.4)
EST. AVERAGE GLUCOSE BLD GHB EST-MCNC: 108 MG/DL
GLOBULIN SER CALC-MCNC: 2.6 G/DL (ref 1.5–4.5)
GLUCOSE SERPL-MCNC: 87 MG/DL (ref 65–99)
HBA1C MFR BLD: 5.4 % (ref 4.8–5.6)
HCT VFR BLD AUTO: 40.5 % (ref 34–46.6)
HCV AB S/CO SERPL IA: <0.1 S/CO RATIO (ref 0–0.9)
HDLC SERPL-MCNC: 43 MG/DL
HGB BLD-MCNC: 13.7 G/DL (ref 11.1–15.9)
HIV 1+2 AB+HIV1 P24 AG SERPL QL IA: NON REACTIVE
IMM GRANULOCYTES # BLD AUTO: 0 X10E3/UL (ref 0–0.1)
IMM GRANULOCYTES NFR BLD AUTO: 0 %
INSULIN SERPL-ACNC: 9.1 UIU/ML (ref 2.6–24.9)
INTERPRETATION, 910389: NORMAL
LDLC SERPL CALC-MCNC: 96 MG/DL (ref 0–99)
LYMPHOCYTES # BLD AUTO: 2.2 X10E3/UL (ref 0.7–3.1)
LYMPHOCYTES NFR BLD AUTO: 26 %
MCH RBC QN AUTO: 28.2 PG (ref 26.6–33)
MCHC RBC AUTO-ENTMCNC: 33.8 G/DL (ref 31.5–35.7)
MCV RBC AUTO: 84 FL (ref 79–97)
MONOCYTES # BLD AUTO: 0.5 X10E3/UL (ref 0.1–0.9)
MONOCYTES NFR BLD AUTO: 6 %
NEUTROPHILS # BLD AUTO: 5.3 X10E3/UL (ref 1.4–7)
NEUTROPHILS NFR BLD AUTO: 64 %
PLATELET # BLD AUTO: 409 X10E3/UL (ref 150–450)
POTASSIUM SERPL-SCNC: 4.5 MMOL/L (ref 3.5–5.2)
PROT SERPL-MCNC: 6.9 G/DL (ref 6–8.5)
RBC # BLD AUTO: 4.85 X10E6/UL (ref 3.77–5.28)
SODIUM SERPL-SCNC: 142 MMOL/L (ref 134–144)
TRIGL SERPL-MCNC: 205 MG/DL (ref 0–149)
VLDLC SERPL CALC-MCNC: 41 MG/DL (ref 5–40)
WBC # BLD AUTO: 8.2 X10E3/UL (ref 3.4–10.8)

## 2019-12-07 NOTE — PROGRESS NOTES
Sent in letter:  All of your labs were really normal, including 2 sugar tests and your insulin level. This is great news, but it would still be wise to keep working on the weight loss (to prevent diabetes later in life). Kidney, liver, electrolytes, cholesterol, and vitamin D were great. Routine screening tests for hepatitis C and HIV were negative.

## 2020-09-11 ENCOUNTER — OFFICE VISIT (OUTPATIENT)
Dept: FAMILY MEDICINE CLINIC | Age: 40
End: 2020-09-11
Payer: COMMERCIAL

## 2020-09-11 VITALS
HEIGHT: 67 IN | DIASTOLIC BLOOD PRESSURE: 85 MMHG | HEART RATE: 74 BPM | RESPIRATION RATE: 18 BRPM | WEIGHT: 192 LBS | BODY MASS INDEX: 30.13 KG/M2 | OXYGEN SATURATION: 98 % | SYSTOLIC BLOOD PRESSURE: 120 MMHG | TEMPERATURE: 97.4 F

## 2020-09-11 DIAGNOSIS — E66.9 OBESITY (BMI 30.0-34.9): ICD-10-CM

## 2020-09-11 DIAGNOSIS — M54.50 NONSPECIFIC LOW BACK PAIN: ICD-10-CM

## 2020-09-11 DIAGNOSIS — K22.70 BARRETT'S ESOPHAGUS WITHOUT DYSPLASIA: ICD-10-CM

## 2020-09-11 DIAGNOSIS — L70.0 ACNE VULGARIS: ICD-10-CM

## 2020-09-11 DIAGNOSIS — Z00.00 ENCOUNTER FOR PREVENTIVE CARE: Primary | ICD-10-CM

## 2020-09-11 DIAGNOSIS — K44.9 HIATAL HERNIA: ICD-10-CM

## 2020-09-11 PROCEDURE — 99396 PREV VISIT EST AGE 40-64: CPT | Performed by: FAMILY MEDICINE

## 2020-09-11 NOTE — PROGRESS NOTES
Chief Complaint   Patient presents with    Complete Physical     1. Have you been to the ER, urgent care clinic since your last visit? Hospitalized since your last visit? Yes Where: [facility] 7/2020 DX: Hives    2. Have you seen or consulted any other health care providers outside of the [address] [place] [address] since your last visit? Include any pap smears or colon screening.  No

## 2020-09-11 NOTE — PROGRESS NOTES
Bhavesh Berger [facility]  [address] [address] [facility], [address]  [phone]             Date of visit: 9/11/2020   Subjective:      History obtained from:  the patient. David Boles is a 41 y.o. female who presents today for   Chief Complaint   Patient presents with    Complete Physical     Feeling well overall  Working from home and doing ok with that    History of hiatal hernia, Barretts esophagus without dysplasia noted on EGD in 8/17 (no change in 9/18)  Will repeat EGD next year  On protonix regularly  Still has occasional symptoms depending on what she eats (sometimes forgets the pantoprazole)  Does have sx more than 2x per week    Chronic low back pain (had unremarkable MRI with Dr. Bisi Castillo) was going to do PT but did not do yet  Has done before and not helpful  Still really bothering her  Wonders if due to sitting on couch instead of office chair    Has been doing intermittent fasting since march and thinks it has helped her  Weight down 9lb from Dec  Eats snack, then lunch, then nothing else  Has been making bagels and thinks she needs to cut back on that, also making her own corn tortillas  Got sick of sandwiches  More monotony in her diet since working from home  Thinks she could increase her veggies.   Not eating a lot of fruits  Difficultly losing weight in the past and has tried many things; tends to do best on low carb diet  In past has been on contrave, metformin  Insurance would not cover david  Has seen dietician  Did Macros program, 80 day obsession, plan C, 21 day sugar detox, Whole 30, myFitness pal at 1800kcal, protein smoothie regimen, coming off etoh, regular exercise, etc    Does a lot of exercise, walks her dogs (one put down in Jan and got a puppy in June), [employer] workouts  Trying to do some foam rolling to work on fascia    Periods mostly regular with pills, some months doesn't bleed, not bothering her  Pap up to date with gyn  Planning every other year mammogram while in her 45s, has family history   Scheduled for mammogram for October    Tends to get acne in the fall but not having it now    Flu shot yesterday at work  Labs all really good last year but needs to repeat for her work Be Well program    Patient Active Problem List    Diagnosis Date Noted    Acne vulgaris 12/06/2019    Chronic pain of both knees 12/06/2019    Nonspecific low back pain 12/06/2019    Garvey's esophagus without dysplasia 06/29/2018    Hiatal hernia 06/29/2018    Obesity (BMI 30.0-34.9) 06/29/2018     Current Outpatient Medications   Medication Sig Dispense Refill    JUNEL 1.5/30, 21, 1.5-30 mg-mcg tab daily.  pantoprazole (PROTONIX) 40 mg tablet Take 1 Tab by mouth daily.  30 Tab 2     Allergies   Allergen Reactions    Contrave [Naltrexone-Bupropion] Other (comments)     Made her extremely emotional     Past Medical History:   Diagnosis Date    AR (allergic rhinitis)     Garvey's esophagus 08/2017    Garvey's esophagus without dysplasia 6/29/2018    Hiatal hernia 08/2017    Overactive bladder      Past Surgical History:   Procedure Laterality Date    HX ENDOSCOPY  08/2017    HX ENDOSCOPY  09/20/2018    Garvey's esophagus no dysplasia repeat 3 years     Family History   Problem Relation Age of Onset    GERD Father     Breast Cancer Maternal Grandmother     Depression Sister     Breast Cancer Maternal Aunt     Colon Cancer Neg Hx     Colon Polyps Neg Hx      Social History     Tobacco Use    Smoking status: Never Smoker    Smokeless tobacco: Never Used   Substance Use Topics    Alcohol use: Yes     Comment: ~ 4 drinks a week (glass of wine)      Social History     Social History Narrative    Works in IT    Mom, dad, sister in the area        Review of Systems  Card: denies chest pain  Pulm: denies shortness of breath  GI: denies hematochezia       3 most recent PHQ Screens 9/11/2020   PHQ Not Done -   Little interest or pleasure in doing things Not at all Feeling down, depressed, irritable, or hopeless Not at all   Total Score PHQ 2 0        Objective:     Vitals:    09/11/20 0850   BP: 120/85   Pulse: 74   Resp: 18   Temp: 97.4 °F (36.3 °C)   TempSrc: Oral   SpO2: 98%   Weight: 192 lb (87.1 kg)   Height: 5' 7\" (1.702 m)     Body mass index is 30.07 kg/m². General: stated age, overweight, and in NAD  Eyes: PERRL, EOMI, no redness or drainage  Nose: no drainage  Mouth: no lesions  Throat: no erythema, exudate or swelling  Neck: supple, symmetrical, trachea midline, no adenopathy and thyroid: not enlarged, symmetric, no tenderness/mass/nodules  Lungs:  clear to auscultation w/o rales, rhonchi, wheezes w/normal effort and no use of accessory muscles of respiration   Heart: regular rate and rhythm, S1, S2 normal, no murmur, click, rub or gallop  Abdomen: soft, nontender, no masses  Ext:  No edema noted. Lymph: no cervical adenopathy appreciated  Skin:  Normal. and no rash or abnormalities   Neuro: normal gait, CN 2-12 intact  Psych: alert and oriented to person, place, time and situation and Speech: appropriate quality, quantity and organization of sentences   Assessment/Plan:       ICD-10-CM ICD-9-CM    1. Encounter for preventive care  Z00.00 V70.0 CBC WITH AUTOMATED DIFF      METABOLIC PANEL, COMPREHENSIVE      LIPID PANEL      HEMOGLOBIN A1C WITH EAG   2. Garvey's esophagus without dysplasia  K22.70 530.85 CBC WITH AUTOMATED DIFF   3. Obesity (BMI 30.0-34. 9)  M87.0 063.26 METABOLIC PANEL, COMPREHENSIVE      LIPID PANEL      HEMOGLOBIN A1C WITH EAG   4. Nonspecific low back pain  M54.5 724.2    5. Acne vulgaris  L70.0 706.1    6.  Hiatal hernia  K44.9 553.3         Orders Placed This Encounter    CBC WITH AUTOMATED DIFF    METABOLIC PANEL, COMPREHENSIVE    LIPID PANEL    HEMOGLOBIN A1C WITH EAG     Preventive up to date  Advised to use protonix daily due to frequent symptoms, discussed its safety  Obesity improved; the intermittent fasting seems to work for her  Needs more veggies in diet  Encouraged continued regular exercise    Discussed the diagnosis and plan and she expressed understanding.     Follow-up and Dispositions    · Return in about 1 year (around 9/11/2021) for Full Angeline Pimentel MD

## 2020-09-11 NOTE — PATIENT INSTRUCTIONS
Well Visit, Ages 25 to 48: Care Instructions Your Care Instructions Physical exams can help you stay healthy. Your doctor has checked your overall health and may have suggested ways to take good care of yourself. He or she also may have recommended tests. At home, you can help prevent illness with healthy eating, regular exercise, and other steps. Follow-up care is a key part of your treatment and safety. Be sure to make and go to all appointments, and call your doctor if you are having problems. It's also a good idea to know your test results and keep a list of the medicines you take. How can you care for yourself at home? · Reach and stay at a healthy weight. This will lower your risk for many problems, such as obesity, diabetes, heart disease, and high blood pressure. · Get at least 30 minutes of physical activity on most days of the week. Walking is a good choice. You also may want to do other activities, such as running, swimming, cycling, or playing tennis or team sports. Discuss any changes in your exercise program with your doctor. · Do not smoke or allow others to smoke around you. If you need help quitting, talk to your doctor about stop-smoking programs and medicines. These can increase your chances of quitting for good. · Talk to your doctor about whether you have any risk factors for sexually transmitted infections (STIs). Having one sex partner (who does not have STIs and does not have sex with anyone else) is a good way to avoid these infections. · Use birth control if you do not want to have children at this time. Talk with your doctor about the choices available and what might be best for you. · Protect your skin from too much sun. When you're outdoors from 10 a.m. to 4 p.m., stay in the shade or cover up with clothing and a hat with a wide brim. Wear sunglasses that block UV rays. Even when it's cloudy, put broad-spectrum sunscreen (SPF 30 or higher) on any exposed skin. · See a dentist one or two times a year for checkups and to have your teeth cleaned. · Wear a seat belt in the car. Follow your doctor's advice about when to have certain tests. These tests can spot problems early. For everyone · Cholesterol. Have the fat (cholesterol) in your blood tested after age 21. Your doctor will tell you how often to have this done based on your age, family history, or other things that can increase your risk for heart disease. · Blood pressure. Have your blood pressure checked during a routine doctor visit. Your doctor will tell you how often to check your blood pressure based on your age, your blood pressure results, and other factors. · Vision. Talk with your doctor about how often to have a glaucoma test. 
· Diabetes. Ask your doctor whether you should have tests for diabetes. · Colon cancer. Your risk for colorectal cancer gets higher as you get older. Some experts say that adults should start regular screening at age 48 and stop at age 76. Others say to start before age 48 or continue after age 76. Talk with your doctor about your risk and when to start and stop screening. For women · Breast exam and mammogram. Talk to your doctor about when you should have a clinical breast exam and a mammogram. Medical experts differ on whether and how often women under 50 should have these tests. Your doctor can help you decide what is right for you. · Cervical cancer screening test and pelvic exam. Begin with a Pap test at age 24. The test often is part of a pelvic exam. Starting at age 27, you may choose to have a Pap test, an HPV test, or both tests at the same time (called co-testing). Talk with your doctor about how often to have testing. · Tests for sexually transmitted infections (STIs). Ask whether you should have tests for STIs. You may be at risk if you have sex with more than one person, especially if your partners do not wear condoms. For men · Tests for sexually transmitted infections (STIs). Ask whether you should have tests for STIs. You may be at risk if you have sex with more than one person, especially if you do not wear a condom. · Testicular cancer exam. Ask your doctor whether you should check your testicles regularly. · Prostate exam. Talk to your doctor about whether you should have a blood test (called a PSA test) for prostate cancer. Experts differ on whether and when men should have this test. Some experts suggest it if you are older than 39 and are -American or have a father or brother who got prostate cancer when he was younger than 72. When should you call for help? Watch closely for changes in your health, and be sure to contact your doctor if you have any problems or symptoms that concern you. Where can you learn more? Go to http://anabelle-kev.info/ Enter P072 in the search box to learn more about \"Well Visit, Ages 25 to 48: Care Instructions. \" Current as of: May 27, 2020               Content Version: 12.6 © 0861-9700 [employer]. Care instructions adapted under license by [employer] (which disclaims liability or warranty for this information). If you have questions about a medical condition or this instruction, always ask your healthcare professional. [address] any warranty or liability for your use of this information.

## 2021-01-25 ENCOUNTER — TRANSCRIBE ORDER (OUTPATIENT)
Dept: SCHEDULING | Age: 41
End: 2021-01-25

## 2021-01-25 DIAGNOSIS — S83.209A: Primary | ICD-10-CM

## 2021-01-29 ENCOUNTER — HOSPITAL ENCOUNTER (OUTPATIENT)
Dept: MRI IMAGING | Age: 41
Discharge: HOME | End: 2021-01-29
Attending: PHYSICAL MEDICINE & REHABILITATION
Payer: COMMERCIAL

## 2021-01-29 DIAGNOSIS — S83.209A: ICD-10-CM

## 2021-01-29 PROCEDURE — 73721 MRI JNT OF LWR EXTRE W/O DYE: CPT

## 2022-03-18 PROBLEM — L70.0 ACNE VULGARIS: Status: ACTIVE | Noted: 2019-12-06

## 2022-03-19 PROBLEM — M25.562 CHRONIC PAIN OF BOTH KNEES: Status: ACTIVE | Noted: 2019-12-06

## 2022-03-19 PROBLEM — K22.70 BARRETT'S ESOPHAGUS WITHOUT DYSPLASIA: Status: ACTIVE | Noted: 2018-06-29

## 2022-03-19 PROBLEM — M54.50 NONSPECIFIC LOW BACK PAIN: Status: ACTIVE | Noted: 2019-12-06

## 2022-03-19 PROBLEM — K44.9 HIATAL HERNIA: Status: ACTIVE | Noted: 2018-06-29

## 2022-03-19 PROBLEM — M25.561 CHRONIC PAIN OF BOTH KNEES: Status: ACTIVE | Noted: 2019-12-06

## 2022-03-19 PROBLEM — G89.29 CHRONIC PAIN OF BOTH KNEES: Status: ACTIVE | Noted: 2019-12-06

## 2022-03-20 PROBLEM — E66.9 OBESITY (BMI 30.0-34.9): Status: ACTIVE | Noted: 2018-06-29
